# Patient Record
Sex: MALE | Race: WHITE | NOT HISPANIC OR LATINO | Employment: OTHER | URBAN - METROPOLITAN AREA
[De-identification: names, ages, dates, MRNs, and addresses within clinical notes are randomized per-mention and may not be internally consistent; named-entity substitution may affect disease eponyms.]

---

## 2017-02-06 ENCOUNTER — OFFICE VISIT (OUTPATIENT)
Dept: INTERNAL MEDICINE | Facility: CLINIC | Age: 52
End: 2017-02-06

## 2017-02-06 ENCOUNTER — APPOINTMENT (OUTPATIENT)
Dept: LAB | Facility: HOSPITAL | Age: 52
End: 2017-02-06

## 2017-02-06 VITALS
HEIGHT: 69 IN | OXYGEN SATURATION: 97 % | BODY MASS INDEX: 25.62 KG/M2 | RESPIRATION RATE: 16 BRPM | SYSTOLIC BLOOD PRESSURE: 150 MMHG | DIASTOLIC BLOOD PRESSURE: 90 MMHG | TEMPERATURE: 99.1 F | HEART RATE: 80 BPM | WEIGHT: 173 LBS

## 2017-02-06 DIAGNOSIS — Z78.9 ALCOHOL USE: ICD-10-CM

## 2017-02-06 DIAGNOSIS — E53.9 VITAMIN B DEFICIENCY: ICD-10-CM

## 2017-02-06 DIAGNOSIS — E55.9 VITAMIN D DEFICIENCY: ICD-10-CM

## 2017-02-06 DIAGNOSIS — R79.89 ELEVATED LFTS: Primary | ICD-10-CM

## 2017-02-06 DIAGNOSIS — E78.00 PURE HYPERCHOLESTEROLEMIA: ICD-10-CM

## 2017-02-06 LAB
25(OH)D3 SERPL-MCNC: 34.5 NG/ML
ALBUMIN SERPL-MCNC: 4.6 G/DL (ref 3.2–4.8)
ALBUMIN/GLOB SERPL: 1.6 G/DL (ref 1.5–2.5)
ALP SERPL-CCNC: 85 U/L (ref 25–100)
ALT SERPL W P-5'-P-CCNC: 171 U/L (ref 7–40)
ANION GAP SERPL CALCULATED.3IONS-SCNC: 9 MMOL/L (ref 3–11)
ARTICHOKE IGE QN: 195 MG/DL (ref 0–130)
AST SERPL-CCNC: 137 U/L (ref 0–33)
BILIRUB SERPL-MCNC: 1.1 MG/DL (ref 0.3–1.2)
BUN BLD-MCNC: 9 MG/DL (ref 9–23)
BUN/CREAT SERPL: 12.9 (ref 7–25)
CALCIUM SPEC-SCNC: 10.1 MG/DL (ref 8.7–10.4)
CHLORIDE SERPL-SCNC: 100 MMOL/L (ref 99–109)
CHOLEST SERPL-MCNC: 289 MG/DL (ref 0–200)
CO2 SERPL-SCNC: 30 MMOL/L (ref 20–31)
CREAT BLD-MCNC: 0.7 MG/DL (ref 0.6–1.3)
GFR SERPL CREATININE-BSD FRML MDRD: 120 ML/MIN/1.73
GLOBULIN UR ELPH-MCNC: 2.8 GM/DL
GLUCOSE BLD-MCNC: 102 MG/DL (ref 70–100)
HDLC SERPL-MCNC: 58 MG/DL (ref 40–60)
POTASSIUM BLD-SCNC: 4.5 MMOL/L (ref 3.5–5.5)
PROT SERPL-MCNC: 7.4 G/DL (ref 5.7–8.2)
SODIUM BLD-SCNC: 139 MMOL/L (ref 132–146)
TRIGL SERPL-MCNC: 262 MG/DL (ref 0–150)
VIT B12 BLD-MCNC: 1109 PG/ML (ref 211–911)

## 2017-02-06 PROCEDURE — 99213 OFFICE O/P EST LOW 20 MIN: CPT | Performed by: INTERNAL MEDICINE

## 2017-02-06 PROCEDURE — 82306 VITAMIN D 25 HYDROXY: CPT | Performed by: INTERNAL MEDICINE

## 2017-02-06 PROCEDURE — 80061 LIPID PANEL: CPT | Performed by: INTERNAL MEDICINE

## 2017-02-06 PROCEDURE — 36415 COLL VENOUS BLD VENIPUNCTURE: CPT | Performed by: INTERNAL MEDICINE

## 2017-02-06 PROCEDURE — 82607 VITAMIN B-12: CPT | Performed by: INTERNAL MEDICINE

## 2017-02-06 PROCEDURE — 80053 COMPREHEN METABOLIC PANEL: CPT | Performed by: INTERNAL MEDICINE
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
## 2017-02-06 NOTE — PATIENT INSTRUCTIONS
1.  Laboratory tests today in the laboratory building.    2.  Plan on Lipitor - for cholesterol management.    3.  Limit alcohol - no more than 2 beers in 24 hours - and 6 beers weekly.    4.  Consider stopping all alcohol intake - for long-term safety.    5.  Return in May - fasting checkup and preventive exam.

## 2017-02-08 ENCOUNTER — TELEPHONE (OUTPATIENT)
Dept: INTERNAL MEDICINE | Facility: CLINIC | Age: 52
End: 2017-02-08

## 2017-02-08 NOTE — TELEPHONE ENCOUNTER
Msg left re labs:  (was 227) better but still too high; LFTs higher; need to return for 7:30 am office conference asap per TGF. Reaffirmed need to stop all alcohol to protect liver. Vit D and B12 has normalized; continue replacement for ongoing basis.

## 2017-02-10 ENCOUNTER — TELEPHONE (OUTPATIENT)
Dept: INTERNAL MEDICINE | Facility: CLINIC | Age: 52
End: 2017-02-10

## 2017-02-10 NOTE — TELEPHONE ENCOUNTER
Left message wondering if pt received our message needs to come for 7:30 a.m. appt asap re: lab work that is too high - requested pt call back to let us know if he received our original call

## 2022-01-26 ENCOUNTER — OFFICE VISIT (OUTPATIENT)
Dept: INTERNAL MEDICINE | Facility: CLINIC | Age: 57
End: 2022-01-26

## 2022-01-26 ENCOUNTER — LAB (OUTPATIENT)
Dept: LAB | Facility: HOSPITAL | Age: 57
End: 2022-01-26

## 2022-01-26 VITALS
SYSTOLIC BLOOD PRESSURE: 130 MMHG | WEIGHT: 168 LBS | TEMPERATURE: 97 F | OXYGEN SATURATION: 98 % | DIASTOLIC BLOOD PRESSURE: 92 MMHG | HEIGHT: 70 IN | BODY MASS INDEX: 24.05 KG/M2 | HEART RATE: 118 BPM

## 2022-01-26 DIAGNOSIS — K21.9 GASTROESOPHAGEAL REFLUX DISEASE, UNSPECIFIED WHETHER ESOPHAGITIS PRESENT: ICD-10-CM

## 2022-01-26 DIAGNOSIS — K76.9 LIVER DISEASE: ICD-10-CM

## 2022-01-26 DIAGNOSIS — Z90.81 HISTORY OF SPLENECTOMY: ICD-10-CM

## 2022-01-26 DIAGNOSIS — E55.9 VITAMIN D DEFICIENCY: ICD-10-CM

## 2022-01-26 DIAGNOSIS — R13.10 DYSPHAGIA, UNSPECIFIED TYPE: ICD-10-CM

## 2022-01-26 DIAGNOSIS — K43.9 VENTRAL HERNIA WITHOUT OBSTRUCTION OR GANGRENE: ICD-10-CM

## 2022-01-26 DIAGNOSIS — Z78.9 ALCOHOL USE: ICD-10-CM

## 2022-01-26 DIAGNOSIS — R05.3 CHRONIC COUGH: ICD-10-CM

## 2022-01-26 DIAGNOSIS — Z23 NEED FOR 23-POLYVALENT PNEUMOCOCCAL POLYSACCHARIDE VACCINE: Primary | ICD-10-CM

## 2022-01-26 PROBLEM — K70.31 ALCOHOLIC CIRRHOSIS OF LIVER WITH ASCITES (HCC): Status: ACTIVE | Noted: 2022-01-26

## 2022-01-26 PROCEDURE — 90732 PPSV23 VACC 2 YRS+ SUBQ/IM: CPT | Performed by: STUDENT IN AN ORGANIZED HEALTH CARE EDUCATION/TRAINING PROGRAM

## 2022-01-26 PROCEDURE — 90471 IMMUNIZATION ADMIN: CPT | Performed by: STUDENT IN AN ORGANIZED HEALTH CARE EDUCATION/TRAINING PROGRAM

## 2022-01-26 PROCEDURE — 99215 OFFICE O/P EST HI 40 MIN: CPT | Performed by: STUDENT IN AN ORGANIZED HEALTH CARE EDUCATION/TRAINING PROGRAM

## 2022-01-26 RX ORDER — PANTOPRAZOLE SODIUM 40 MG/1
40 TABLET, DELAYED RELEASE ORAL DAILY
Qty: 30 TABLET | Refills: 3 | Status: SHIPPED | OUTPATIENT
Start: 2022-01-26 | End: 2022-03-30

## 2022-01-26 NOTE — PROGRESS NOTES
Immunization  Immunization performed in Left deltoil by Kailee Henderson MA. Patient tolerated the procedure well without complications.  01/26/22   Kailee Henderson MA
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
## 2022-01-28 ENCOUNTER — TELEPHONE (OUTPATIENT)
Dept: INTERNAL MEDICINE | Facility: CLINIC | Age: 57
End: 2022-01-28

## 2022-01-28 DIAGNOSIS — K76.9 LIVER DISEASE: Primary | ICD-10-CM

## 2022-01-28 LAB
25(OH)D3+25(OH)D2 SERPL-MCNC: 37 NG/ML (ref 30–100)
ALBUMIN SERPL-MCNC: NORMAL G/DL
ALP SERPL-CCNC: NORMAL U/L
ALT SERPL-CCNC: NORMAL U/L
AST SERPL-CCNC: NORMAL U/L
BILIRUB SERPL-MCNC: NORMAL MG/DL
BUN SERPL-MCNC: NORMAL MG/DL
CALCIUM SERPL-MCNC: NORMAL MG/DL
CHLORIDE SERPL-SCNC: NORMAL MMOL/L
CHOLEST SERPL-MCNC: 175 MG/DL (ref 0–200)
CO2 SERPL-SCNC: NORMAL MMOL/L
CREAT SERPL-MCNC: NORMAL MG/DL
ERYTHROCYTE [DISTWIDTH] IN BLOOD BY AUTOMATED COUNT: 11.4 % (ref 12.3–15.4)
GLUCOSE SERPL-MCNC: NORMAL MG/DL
HAV AB SER QL IA: NEGATIVE
HBA1C MFR BLD: 5.1 % (ref 4.8–5.6)
HBV CORE AB SERPL QL IA: NEGATIVE
HBV SURFACE AB SER QL: NON REACTIVE
HBV SURFACE AG SERPL QL IA: NEGATIVE
HCT VFR BLD AUTO: 46.2 % (ref 37.5–51)
HCV AB S/CO SERPL IA: 0.2 S/CO RATIO (ref 0–0.9)
HDLC SERPL-MCNC: 24 MG/DL (ref 40–60)
HGB BLD-MCNC: 15.8 G/DL (ref 13–17.7)
LDLC SERPL CALC-MCNC: 125 MG/DL (ref 0–100)
MCH RBC QN AUTO: 32.2 PG (ref 26.6–33)
MCHC RBC AUTO-ENTMCNC: 34.2 G/DL (ref 31.5–35.7)
MCV RBC AUTO: 94.3 FL (ref 79–97)
PLATELET # BLD AUTO: 460 10*3/MM3 (ref 140–450)
POTASSIUM SERPL-SCNC: NORMAL MMOL/L
PROT SERPL-MCNC: NORMAL G/DL
RBC # BLD AUTO: 4.9 10*6/MM3 (ref 4.14–5.8)
REQUEST PROBLEM: NORMAL
SODIUM SERPL-SCNC: NORMAL MMOL/L
TRIGL SERPL-MCNC: 143 MG/DL (ref 0–150)
VIT B12 SERPL-MCNC: 665 PG/ML (ref 211–946)
VLDLC SERPL CALC-MCNC: 26 MG/DL (ref 5–40)
WBC # BLD AUTO: 16.72 10*3/MM3 (ref 3.4–10.8)

## 2022-01-28 NOTE — TELEPHONE ENCOUNTER
----- Message from Nurys Stephens MD sent at 1/26/2022  3:37 PM EST -----  Please call Clinton (this is the name he goes by) and let him know that I sent in a prescription for reflux medication for him to take daily 30 minutes prior to his first meal.

## 2022-01-28 NOTE — TELEPHONE ENCOUNTER
----- Message from Nurys Stephens MD sent at 1/27/2022  6:21 PM EST -----  Regarding: Labs  Oscar Mendoza had labs collected yesterday that usually come back within 24 hours and no results are available. Will you ask the lab if there was an issue with the collection? Thanks!

## 2022-01-28 NOTE — TELEPHONE ENCOUNTER
Michelle LEBLANC/ [facility] called to report PT had a High potassium and that they had a bad read on the CMP lab. Stated they would need another order/sample. Please advise.

## 2022-01-31 ENCOUNTER — TELEPHONE (OUTPATIENT)
Dept: INTERNAL MEDICINE | Facility: CLINIC | Age: 57
End: 2022-01-31

## 2022-02-01 ENCOUNTER — LAB (OUTPATIENT)
Dept: LAB | Facility: HOSPITAL | Age: 57
End: 2022-02-01

## 2022-02-02 ENCOUNTER — TELEPHONE (OUTPATIENT)
Dept: INTERNAL MEDICINE | Facility: CLINIC | Age: 57
End: 2022-02-02

## 2022-02-02 NOTE — TELEPHONE ENCOUNTER
----- Message from Nurys Stephens MD sent at 2/2/2022 11:00 AM EST -----  Please call Clinton and let him know that his final lab work has returned. His kidney function and electrolytes look ok. Some of his liver numbers are somewhat elevated. We will know more about liver issues once he gets his ultrasound.

## 2022-02-02 NOTE — TELEPHONE ENCOUNTER
Patient advised of lab results and will know more about liver issues once he has gotten the ultrasound

## 2022-02-08 ENCOUNTER — HOSPITAL ENCOUNTER (OUTPATIENT)
Dept: ULTRASOUND IMAGING | Facility: HOSPITAL | Age: 57
Discharge: HOME | End: 2022-02-08

## 2022-02-08 ENCOUNTER — HOSPITAL ENCOUNTER (OUTPATIENT)
Dept: GENERAL RADIOLOGY | Facility: HOSPITAL | Age: 57
Discharge: HOME | End: 2022-02-08

## 2022-02-08 DIAGNOSIS — K76.9 LIVER DISEASE: ICD-10-CM

## 2022-02-08 DIAGNOSIS — R05.3 CHRONIC COUGH: ICD-10-CM

## 2022-02-08 PROCEDURE — 71046 X-RAY EXAM CHEST 2 VIEWS: CPT

## 2022-02-08 PROCEDURE — 76705 ECHO EXAM OF ABDOMEN: CPT

## 2022-02-09 ENCOUNTER — OFFICE VISIT (OUTPATIENT)
Dept: INTERNAL MEDICINE | Facility: CLINIC | Age: 57
End: 2022-02-09

## 2022-02-09 ENCOUNTER — APPOINTMENT (OUTPATIENT)
Dept: ULTRASOUND IMAGING | Facility: HOSPITAL | Age: 57
End: 2022-02-09

## 2022-02-09 VITALS
SYSTOLIC BLOOD PRESSURE: 130 MMHG | DIASTOLIC BLOOD PRESSURE: 80 MMHG | HEIGHT: 70 IN | OXYGEN SATURATION: 97 % | HEART RATE: 117 BPM | BODY MASS INDEX: 24.34 KG/M2 | WEIGHT: 170 LBS | TEMPERATURE: 97 F

## 2022-02-09 DIAGNOSIS — R05.9 COUGH: ICD-10-CM

## 2022-02-09 DIAGNOSIS — K70.31 ALCOHOLIC CIRRHOSIS OF LIVER WITH ASCITES: Primary | ICD-10-CM

## 2022-02-09 PROCEDURE — 99214 OFFICE O/P EST MOD 30 MIN: CPT | Performed by: STUDENT IN AN ORGANIZED HEALTH CARE EDUCATION/TRAINING PROGRAM

## 2022-02-09 RX ORDER — SPIRONOLACTONE 100 MG/1
100 TABLET, FILM COATED ORAL DAILY
Qty: 30 TABLET | Refills: 0 | Status: SHIPPED | OUTPATIENT
Start: 2022-02-09 | End: 2022-03-17

## 2022-02-09 RX ORDER — FUROSEMIDE 40 MG/1
40 TABLET ORAL DAILY
Qty: 30 TABLET | Refills: 0 | Status: SHIPPED | OUTPATIENT
Start: 2022-02-09 | End: 2022-03-30

## 2022-02-09 NOTE — PROGRESS NOTES
Internal Medicine Established Office Visit      Date: 2022     Patient Name: Oscar Mendoza  : 1965   MRN: [id number]     Chief Complaint:    Chief Complaint   Patient presents with   • Follow-up   • Cough       History of Present Illness: Oscar Mendoza is a 56 y.o. male who presents to clinic today for follow up. He reports that issues with reflux and dysphagia have almost completely resolved with starting pantoprazole 40 mg daily. He is also no longer having diarrhea. He feels that his muscles are stronger and that his appetite has improved. He had a liver ultrasound performed yesterday which demonstrated a nodular liver consistent with cirrhosis and a large amount of ascites.  He also continues to have a cough which is usually dry but occasionally he coughs up thin, foamy fluid.  He remains abstinent from alcohol.    Subjective     I have reviewed and the following portions of the patient's history were updated as appropriate: past family history, past medical history, past social history, past surgical history and problem list.     Medications:     Current Outpatient Medications:   •  Cholecalciferol (VITAMIN D3) 5000 UNITS capsule capsule, Take 5,000 Units by mouth Daily., Disp: , Rfl:   •  Multiple Vitamins-Minerals (CENTRUM ADULTS PO), Take 1 tablet by mouth Daily., Disp: , Rfl:   •  pantoprazole (Protonix) 40 MG EC tablet, Take 1 tablet by mouth Daily., Disp: 30 tablet, Rfl: 3  •  vitamin B-12 (CYANOCOBALAMIN) 2500 MCG sublingual tablet tablet, Place 1 tablet under the tongue Daily., Disp: , Rfl:   •  Diclofenac Sodium (VOLTAREN) 1 % gel gel, Apply 4 g topically to the appropriate area as directed 2 (Two) Times a Day As Needed (for muscle pain)., Disp: 50 g, Rfl: 0  •  furosemide (Lasix) 40 MG tablet, Take 1 tablet by mouth Daily., Disp: 30 tablet, Rfl: 0  •  spironolactone (ALDACTONE) 100 MG tablet, Take 1 tablet by mouth Daily., Disp: 30 tablet, Rfl: 0    Allergies:   No  "Known Allergies    Objective     Physical Exam:   Vital Signs:   Vitals:    02/09/22 1332   BP: 130/80   Pulse: 117   Temp: 97 °F (36.1 °C)   SpO2: 97%   Weight: 77.1 kg (170 lb)   Height: 177.8 cm (70\")   PainSc: 0-No pain     Body mass index is 24.39 kg/m².     Physical Exam  Vitals and nursing note reviewed.   Constitutional:       Appearance: Normal appearance.   Cardiovascular:      Rate and Rhythm: Normal rate and regular rhythm.      Heart sounds: Normal heart sounds.   Pulmonary:      Effort: Pulmonary effort is normal.      Breath sounds: Normal breath sounds. No wheezing, rhonchi or rales.   Abdominal:      General: There is distension.      Tenderness: There is no abdominal tenderness.      Hernia: A hernia is present.   Skin:     General: Skin is warm and dry.   Neurological:      Mental Status: He is alert.   Psychiatric:         Mood and Affect: Mood normal.         Behavior: Behavior normal.       Assessment / Plan      Assessment/Plan:   Diagnoses and all orders for this visit:    1. Alcoholic cirrhosis of liver with ascites (HCC) (Primary)  Decompensated by ascites. No signs of other complications. Abdomen is non-tender. Kidney function is normal so will initiate diuretic therapy to relieve ascites if possible. Lasix 40 mg daily and spironolactone 10 mg daily started. Plan to repeat BMP in 1 week. Order has been placed.   - Ascites: Large volume. Non-tender. New diagnosis so no prior paracentesis. Starting Lasix 40 mg daily and spironolactone 100 mg daily. Repeat BMP in ~ 1 week.   - EV: No melena/hematochezia. Last EGD in 2017 at [place] with no varices but also no signs of cirrhosis at that time. Referral to GI placed at last appointment for assessment.   - HE: No confusion or asterixis. Discussed cause and signs/symptoms. No need for lactulose.   - MELD-Na: 11. Referral to transplant surgery since MELD is >10.   - No thrombocytopenia or hyponatremia.   - Last EtOH use: 01/01/2022. Counseled " patient on continued abstinence.   - Discussed recommended vaccines. Patient to receive initial Hepatitis A and Flu vaccine at the pharmacy. Will need to complete Hepatitis A series and Hepatitis B series as he was non-immune.     2. Cough  May be in the setting of atelectasis and hepatic hydrothorax in the setting of large volume ascites. No infectious type symptoms.        Follow Up:   Return in about 2 weeks (around 2/23/2022) for Recheck, 30 minute visit .    Time:  I spent approximately 35 minutes providing clinical care for this patient; including review of patient's chart and provider documentation, face to face time spent with patient in examination room (obtaining history, performing physical exam, discussing diagnosis and management options), placing orders, and completing patient documentation.     Nurys Stephens MD  [facility] [place] [place]

## 2022-02-14 ENCOUNTER — TELEPHONE (OUTPATIENT)
Dept: INTERNAL MEDICINE | Facility: CLINIC | Age: 57
End: 2022-02-14

## 2022-02-14 NOTE — TELEPHONE ENCOUNTER
PATIENT HAS CALLED AND WANTED TO LET PCP KNOW THAT HIS ANKLES AND FEET ARE SWOLLEN AND IS REQUESTING A CALL BACK TO LET HIM KNOW IF HE SHOULD BE CONCERNED.    CALL BACK NUMBER [phone]

## 2022-02-14 NOTE — TELEPHONE ENCOUNTER
Pt states he is taking meds and is weighing himself. States seems to be in abdomen mostly, but is in feet/ankles as well. Pt states has gone down some and swelling gets better in feet/ankles after he elevates them.

## 2022-02-14 NOTE — TELEPHONE ENCOUNTER
Pt couldn't make in to office today or tomorrow for any of the open times, scheduled for Wed 2/16/22

## 2022-02-16 ENCOUNTER — OFFICE VISIT (OUTPATIENT)
Dept: INTERNAL MEDICINE | Facility: CLINIC | Age: 57
End: 2022-02-16

## 2022-02-16 VITALS
OXYGEN SATURATION: 96 % | WEIGHT: 164 LBS | DIASTOLIC BLOOD PRESSURE: 86 MMHG | SYSTOLIC BLOOD PRESSURE: 118 MMHG | HEART RATE: 119 BPM | BODY MASS INDEX: 23.48 KG/M2 | HEIGHT: 70 IN

## 2022-02-16 DIAGNOSIS — Z23 NEED FOR VACCINATION: ICD-10-CM

## 2022-02-16 DIAGNOSIS — K70.31 ALCOHOLIC CIRRHOSIS OF LIVER WITH ASCITES: Primary | ICD-10-CM

## 2022-02-16 PROCEDURE — 90632 HEPA VACCINE ADULT IM: CPT | Performed by: STUDENT IN AN ORGANIZED HEALTH CARE EDUCATION/TRAINING PROGRAM

## 2022-02-16 PROCEDURE — 90746 HEPB VACCINE 3 DOSE ADULT IM: CPT | Performed by: STUDENT IN AN ORGANIZED HEALTH CARE EDUCATION/TRAINING PROGRAM

## 2022-02-16 PROCEDURE — 90472 IMMUNIZATION ADMIN EACH ADD: CPT | Performed by: STUDENT IN AN ORGANIZED HEALTH CARE EDUCATION/TRAINING PROGRAM

## 2022-02-16 PROCEDURE — 90471 IMMUNIZATION ADMIN: CPT | Performed by: STUDENT IN AN ORGANIZED HEALTH CARE EDUCATION/TRAINING PROGRAM

## 2022-02-16 PROCEDURE — 99214 OFFICE O/P EST MOD 30 MIN: CPT | Performed by: STUDENT IN AN ORGANIZED HEALTH CARE EDUCATION/TRAINING PROGRAM

## 2022-02-16 NOTE — PROGRESS NOTES
"              Internal Medicine Acute Visit     Patient Name: Oscar Mendoza  : 1965   MRN: [id number]     Chief Complaint:    Chief Complaint   Patient presents with   • Edema     feet and ankles, was pretty bad on Monday-better today       History of Present Illness: Oscar Mendoza is a 56 y.o. male who presents for LE edema. He reports that when he made the appointment earlier this week, the edema was much worse. It has improved over the last few days. He has also noted a reduction in fluid around his abdomen. He has had a 6 lb weight loss since his last appointment. He reports having more energy and overall feeling better. He continues to avoid alcohol use.     Subjective     I have reviewed and the following portions of the patient's history were updated as appropriate: past family history, past medical history, past social history, past surgical history and problem list.    Allergies:   No Known Allergies    Objective     Physical Exam:  Vital Signs:   Vitals:    22 1348   BP: 118/86   Pulse: 119   SpO2: 96%  Comment: ra   Weight: 74.4 kg (164 lb)   Height: 177.8 cm (70\")   PainSc: 0-No pain     Body mass index is 23.53 kg/m².    Physical Exam  Vitals and nursing note reviewed.   Constitutional:       General: He is not in acute distress.     Appearance: Normal appearance.   HENT:      Head: Normocephalic and atraumatic.   Abdominal:      General: Abdomen is protuberant.      Palpations: There is fluid wave.      Tenderness: There is no abdominal tenderness.      Hernia: A hernia is present. Hernia is present in the umbilical area and ventral area.   Neurological:      Mental Status: He is alert.       Assessment / Plan      Assessment/Plan:   Diagnoses and all orders for this visit:    1. Alcoholic cirrhosis of liver with ascites (HCC) (Primary)  LE edema and ascites improving with Lasix 40 mg and Spironolactone 100 mg daily. Continue these medications for now. Repeat CMP and mag " ordered while on new diuretic therapy to follow renal function. First Hepatitis A and Hepatitis B vaccines given today.     2. Need for vaccination  -     Hepatitis A Vaccine Adult IM  -     Hepatitis B Vaccine Adult IM - Engerix    Follow Up:   Return for Next scheduled follow up.    Time:   I spent approximately 15 minutes providing clinical care for this patient; including review of patient's chart and provider documentation, face to face time spent with patient in examination room (obtaining history, performing physical exam, discussing diagnosis and management options), placing orders, and completing patient documentation.     Prescription drug management completed and 3 unique labs ordered consistent with moderate MDM.     Nurys Stephens MD  [facility] [place] [facility] [place]

## 2022-02-16 NOTE — PROGRESS NOTES
Immunization  Immunization performed in LD AND RD by Bernarda White MA. Patient tolerated the procedure well without complications.  02/16/22   Bernarda White MA

## 2022-02-24 ENCOUNTER — LAB (OUTPATIENT)
Dept: LAB | Facility: HOSPITAL | Age: 57
End: 2022-02-24

## 2022-02-24 ENCOUNTER — OFFICE VISIT (OUTPATIENT)
Dept: INTERNAL MEDICINE | Facility: CLINIC | Age: 57
End: 2022-02-24

## 2022-02-24 VITALS
HEART RATE: 115 BPM | OXYGEN SATURATION: 95 % | BODY MASS INDEX: 22.19 KG/M2 | SYSTOLIC BLOOD PRESSURE: 122 MMHG | RESPIRATION RATE: 16 BRPM | HEIGHT: 70 IN | DIASTOLIC BLOOD PRESSURE: 84 MMHG | WEIGHT: 155 LBS

## 2022-02-24 DIAGNOSIS — Z78.9 ALCOHOL USE: ICD-10-CM

## 2022-02-24 DIAGNOSIS — R10.13 DYSPEPSIA: ICD-10-CM

## 2022-02-24 DIAGNOSIS — K70.31 ALCOHOLIC CIRRHOSIS OF LIVER WITH ASCITES: Primary | ICD-10-CM

## 2022-02-24 PROCEDURE — 99214 OFFICE O/P EST MOD 30 MIN: CPT | Performed by: STUDENT IN AN ORGANIZED HEALTH CARE EDUCATION/TRAINING PROGRAM

## 2022-02-24 NOTE — PROGRESS NOTES
"     Internal Medicine Established Office Visit      Date: 2022     Patient Name: Oscar Mendoza  : 1965   MRN: [id number]     Chief Complaint:    Chief Complaint   Patient presents with   • Follow-up     2 week follow up        History of Present Illness: Oscar Mendoza is a 56 y.o. male who presents to clinic today for follow up. Weight is down 9 lbs since 2022 due to continued diuresis. Abdomen is much smaller. He denies LE edema. Has not had EtOH. Stopped taking pantoprazole and has not had any further epigastric pain.     Subjective     I have reviewed and the following portions of the patient's history were updated as appropriate: past family history, past medical history, past social history, past surgical history and problem list.     Medications:     Current Outpatient Medications:   •  Cholecalciferol (VITAMIN D3) 5000 UNITS capsule capsule, Take 5,000 Units by mouth Daily., Disp: , Rfl:   •  Diclofenac Sodium (VOLTAREN) 1 % gel gel, Apply 4 g topically to the appropriate area as directed 2 (Two) Times a Day As Needed (for muscle pain)., Disp: 50 g, Rfl: 0  •  furosemide (Lasix) 40 MG tablet, Take 1 tablet by mouth Daily., Disp: 30 tablet, Rfl: 0  •  Multiple Vitamins-Minerals (CENTRUM ADULTS PO), Take 1 tablet by mouth Daily., Disp: , Rfl:   •  spironolactone (ALDACTONE) 100 MG tablet, Take 1 tablet by mouth Daily., Disp: 30 tablet, Rfl: 0  •  vitamin B-12 (CYANOCOBALAMIN) 2500 MCG sublingual tablet tablet, Place 1 tablet under the tongue Daily., Disp: , Rfl:   •  pantoprazole (Protonix) 40 MG EC tablet, Take 1 tablet by mouth Daily., Disp: 30 tablet, Rfl: 3    Allergies:   No Known Allergies    Objective     Physical Exam:   Vital Signs:   Vitals:    22 1343   BP: 122/84   BP Location: Left arm   Patient Position: Sitting   Cuff Size: Adult   Pulse: 115   Resp: 16   SpO2: 95%   Weight: 70.3 kg (155 lb)   Height: 177.8 cm (70\")   PainSc: 0-No pain     Body mass index " is 22.24 kg/m².     Physical Exam  Vitals and nursing note reviewed.   Constitutional:       Appearance: Normal appearance.   Cardiovascular:      Rate and Rhythm: Normal rate.   Pulmonary:      Effort: Pulmonary effort is normal. No respiratory distress.   Abdominal:      General: There is distension (very much improved though).      Palpations: Abdomen is soft.      Hernia: A hernia is present.   Musculoskeletal:      Right lower leg: No edema.      Left lower leg: No edema.   Skin:     General: Skin is warm and dry.   Neurological:      Mental Status: He is alert.   Psychiatric:         Mood and Affect: Mood normal.         Behavior: Behavior normal.       Assessment / Plan      Assessment/Plan:   Diagnoses and all orders for this visit:    1. Alcoholic cirrhosis of liver with ascites (HCC) (Primary)  Decompensated by ascites. No signs of other complications. Abdomen is non-tender and not as distended as previously. Continue Lasix 40 mg daily and spironolactone 10 mg daily. BMP and mag ordered at last appointment still need to be drawn.   - Ascites: Improved with diuresis. No prior paracentesis. Non-tender. Conitnue Lasix 40 mg daily and spironolactone 100 mg daily.  - EV: No melena/hematochezia. Last EGD in 2017 at [facility] with no varices but also no signs of cirrhosis at that time. Referral to GI placed at last appointment for assessment. Missed first appointment but will reschedule.   - HE: No confusion or asterixis. Discussed cause and signs/symptoms. No need for lactulose.   - MELD-Na: 11. Referral to transplant surgery since MELD is >10. They have called him and he has returned call but they keep missing each other. Will retry calling them. Discussed what the appointment was for.   - No thrombocytopenia or hyponatremia.   - Last EtOH use: 01/01/2022. Counseled patient on continued abstinence.   - Discussed recommended vaccines. Second Hepatitis A due 08/2022. Second Hepatitis B due ~03/16/2022.   - Referral  to dietician to discuss best diet to regain muscle mass.   - Needs RUQ U/S for HCC surveillance. Will order if he does not establish with GI soon.     2. Alcohol use  Continued abstinence with last drink 01/01/2022.     3. Dyspepsia  Resolved now that he has not had EtOH. Likely gastritis from EtOH intake. Stable off of pantoprazole. No further issues with dysphagia.      Follow Up:   Return in about 2 months (around 4/24/2022) for Recheck, 30 minute .    Time:  I spent approximately 25 minutes providing clinical care for this patient; including review of patient's chart and provider documentation, face to face time spent with patient in examination room (obtaining history, performing physical exam, discussing diagnosis and management options), placing orders, and completing patient documentation.     Nurys Stephens MD  [facility]

## 2022-02-28 LAB
INR PPP: 1.2 (ref 0.85–1.16)
PROTHROMBIN TIME: 14.1 SECONDS (ref 11.5–14)

## 2022-03-17 DIAGNOSIS — K70.31 ALCOHOLIC CIRRHOSIS OF LIVER WITH ASCITES: ICD-10-CM

## 2022-03-17 RX ORDER — SPIRONOLACTONE 100 MG/1
100 TABLET, FILM COATED ORAL DAILY
Qty: 30 TABLET | Refills: 0 | Status: SHIPPED | OUTPATIENT
Start: 2022-03-17 | End: 2022-03-30

## 2022-03-30 ENCOUNTER — OFFICE VISIT (OUTPATIENT)
Dept: GASTROENTEROLOGY | Facility: CLINIC | Age: 57
End: 2022-03-30

## 2022-03-30 VITALS
HEART RATE: 98 BPM | TEMPERATURE: 97.5 F | DIASTOLIC BLOOD PRESSURE: 74 MMHG | OXYGEN SATURATION: 98 % | BODY MASS INDEX: 22.19 KG/M2 | SYSTOLIC BLOOD PRESSURE: 102 MMHG | WEIGHT: 155 LBS | HEIGHT: 70 IN

## 2022-03-30 DIAGNOSIS — Z12.11 SCREEN FOR COLON CANCER: ICD-10-CM

## 2022-03-30 DIAGNOSIS — K70.31 ALCOHOLIC CIRRHOSIS OF LIVER WITH ASCITES: Primary | ICD-10-CM

## 2022-03-30 PROCEDURE — 99204 OFFICE O/P NEW MOD 45 MIN: CPT | Performed by: NURSE PRACTITIONER
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
## 2022-05-09 ENCOUNTER — TELEPHONE (OUTPATIENT)
Dept: GASTROENTEROLOGY | Facility: CLINIC | Age: 57
End: 2022-05-09

## 2022-05-11 ENCOUNTER — OFFICE VISIT (OUTPATIENT)
Dept: INTERNAL MEDICINE | Facility: CLINIC | Age: 57
End: 2022-05-11

## 2022-05-11 VITALS
TEMPERATURE: 97 F | BODY MASS INDEX: 22.19 KG/M2 | OXYGEN SATURATION: 98 % | DIASTOLIC BLOOD PRESSURE: 66 MMHG | WEIGHT: 155 LBS | SYSTOLIC BLOOD PRESSURE: 112 MMHG | HEART RATE: 64 BPM | HEIGHT: 70 IN

## 2022-05-11 DIAGNOSIS — M54.50 CHRONIC BILATERAL LOW BACK PAIN WITHOUT SCIATICA: ICD-10-CM

## 2022-05-11 DIAGNOSIS — I83.811 VARICOSE VEINS OF RIGHT LOWER EXTREMITY WITH PAIN: ICD-10-CM

## 2022-05-11 DIAGNOSIS — K70.31 ALCOHOLIC CIRRHOSIS OF LIVER WITH ASCITES: Primary | ICD-10-CM

## 2022-05-11 DIAGNOSIS — G89.29 CHRONIC BILATERAL LOW BACK PAIN WITHOUT SCIATICA: ICD-10-CM

## 2022-05-11 DIAGNOSIS — D22.9 BENIGN SKIN MOLE: ICD-10-CM

## 2022-05-11 PROCEDURE — 99214 OFFICE O/P EST MOD 30 MIN: CPT | Performed by: STUDENT IN AN ORGANIZED HEALTH CARE EDUCATION/TRAINING PROGRAM

## 2022-05-11 NOTE — PROGRESS NOTES
Internal Medicine Established Office Visit      Date: 2022     Patient Name: Oscar Mendoza  : 1965   MRN: [id number]     Chief Complaint:    Chief Complaint   Patient presents with   • Follow-up     Losing weight   • Back Pain       History of Present Illness: Oscar Mendoza is a 56 y.o. male who presents to clinic today for follow up.     EtOH Cirrhosis, compensated   No longer on diuretics with no return of ascites or LE edema. No HE or melena/hematemsis. Established with GI with plan for upcoming EGD for variceal surveillance. Started series for hepatitis B at last appointment. Needs vaccination for hepatitis A as well. Remains EtOH free.     - He reports new areas on his lower extremity that are raised with irregular borders and dark purple. Sometimes they seem to be more raised than usual. Resemble spider veins.     - He has a mole on his hair line that was cut during his last haircut so it now feels abnormal. He is unable to see it so cannot speak to it's color or borders. He does note that it has not changed in shape with the exception of being cut.     - He is having more back pain. Describes it as occasional tension in his lower back muscles that is sharp and takes his breath away. At baseline, he has achy pain in all his joints. Unable to take NSAIDs due to cirrhosis. Tylenol does not help.       Subjective     I have reviewed and the following portions of the patient's history were updated as appropriate: past family history, past medical history, past social history, past surgical history and problem list.     Medications:     Current Outpatient Medications:   •  Cholecalciferol (VITAMIN D3) 5000 UNITS capsule capsule, Take 5,000 Units by mouth Daily., Disp: , Rfl:   •  Multiple Vitamins-Minerals (CENTRUM ADULTS PO), Take 1 tablet by mouth Daily., Disp: , Rfl:   •  vitamin B-12 (CYANOCOBALAMIN) 2500 MCG sublingual tablet tablet, Place 1 tablet under the tongue Daily.,  "Disp: , Rfl:     Allergies:   No Known Allergies    Objective     Physical Exam:   Vital Signs:   Vitals:    05/11/22 1400   BP: 112/66   Pulse: 64   Temp: 97 °F (36.1 °C)   SpO2: 98%   Weight: 70.3 kg (155 lb)   Height: 177.8 cm (70\")   PainSc:   6     Body mass index is 22.24 kg/m².     Physical Exam  Vitals and nursing note reviewed.   Constitutional:       General: He is not in acute distress.     Appearance: He is not ill-appearing or toxic-appearing.   HENT:      Head: Normocephalic and atraumatic.   Eyes:      General: No scleral icterus.  Cardiovascular:      Rate and Rhythm: Normal rate and regular rhythm.   Pulmonary:      Effort: Pulmonary effort is normal. No respiratory distress.   Abdominal:      General: There is no distension.      Tenderness: There is no abdominal tenderness.   Musculoskeletal:      Right lower leg: No edema.      Left lower leg: No edema.   Skin:     Comments: 3-4 cm area of enlarged, superficial veins on RLE. Mole noted on the lower right hair line. Appears to be healing from being cut. Otherwise, no abnormal borders, asymmetry, or abnormal color.   Neurological:      Mental Status: He is alert.       Assessment / Plan      Assessment/Plan:   Diagnoses and all orders for this visit:    1. Alcoholic cirrhosis of liver with ascites (HCC) (Primary)  Now compensated. No longer requiring diuretics to treat ascites. No signs of other complications. Abdomen is non-tender and not as distended.   - Ascites: Resolved with diuresis with Lasic and spironolactone.  No longer requiring these medications and maintaining without them. No prior paracentesis. Non-tender.  - EV: No melena/hematochezia. Last EGD in 2017 at [place] with no varices but also no signs of cirrhosis at that time. Now following with GI with plan for upcoming EGD for variceal surveillance.   - HE: No confusion or asterixis. Discussed cause and signs/symptoms. No need for lactulose.   - MELD-Na: Based on labs from 02/01/2022: " 11. Referral to transplant surgery since MELD is >10 place in 02/2022. MELD may have improved now that he is no longer drinking so may not require this referral. Can recheck MELD labs at next appointment.   - No thrombocytopenia or hyponatremia.   - Last EtOH use: 01/01/2022. Counseled patient on continued abstinence.   - Discussed recommended vaccines. Second Hepatitis A due 08/2022. Second Hepatitis B due ~03/16/2022.   - RUQ U/S for HCC surveillance q6months. Next due 07/2022.     2. Chronic bilateral low back pain without sciatica  Avoid NSAIDs. Consider PT.     3. Varicose veins of right lower extremity with pain   Referral to vascular.     4. Benign skin mole  Monitor.     Follow Up:   Return in about 3 months (around 8/11/2022) for he is considering who he would like to switch to .    Time:  I spent approximately 25 minutes providing clinical care for this patient; including review of patient's chart and provider documentation, face to face time spent with patient in examination room (obtaining history, performing physical exam, discussing diagnosis and management options), placing orders, and completing patient documentation.     Nurys Stephens MD  [facility]

## 2022-07-05 RX ORDER — SODIUM, POTASSIUM,MAG SULFATES 17.5-3.13G
SOLUTION, RECONSTITUTED, ORAL ORAL
Qty: 354 ML | Refills: 0 | Status: SHIPPED | OUTPATIENT
Start: 2022-07-05

## 2023-12-08 ENCOUNTER — OFFICE VISIT (OUTPATIENT)
Dept: FAMILY MEDICINE CLINIC | Facility: CLINIC | Age: 58
End: 2023-12-08

## 2023-12-08 VITALS
WEIGHT: 185 LBS | OXYGEN SATURATION: 96 % | SYSTOLIC BLOOD PRESSURE: 124 MMHG | BODY MASS INDEX: 26.48 KG/M2 | HEART RATE: 80 BPM | HEIGHT: 70 IN | DIASTOLIC BLOOD PRESSURE: 76 MMHG

## 2023-12-08 DIAGNOSIS — E55.9 VITAMIN D DEFICIENCY: ICD-10-CM

## 2023-12-08 DIAGNOSIS — Z12.11 SCREENING FOR COLON CANCER: ICD-10-CM

## 2023-12-08 DIAGNOSIS — Z90.81 HISTORY OF SPLENECTOMY: ICD-10-CM

## 2023-12-08 DIAGNOSIS — Z00.00 ENCOUNTER FOR MEDICAL EXAMINATION TO ESTABLISH CARE: ICD-10-CM

## 2023-12-08 DIAGNOSIS — Z23 IMMUNIZATION DUE: ICD-10-CM

## 2023-12-08 DIAGNOSIS — E78.00 PURE HYPERCHOLESTEROLEMIA: ICD-10-CM

## 2023-12-08 DIAGNOSIS — Z12.5 SCREENING PSA (PROSTATE SPECIFIC ANTIGEN): ICD-10-CM

## 2023-12-08 DIAGNOSIS — E53.9 VITAMIN B DEFICIENCY: ICD-10-CM

## 2023-12-08 DIAGNOSIS — K21.9 GASTROESOPHAGEAL REFLUX DISEASE, UNSPECIFIED WHETHER ESOPHAGITIS PRESENT: ICD-10-CM

## 2023-12-08 DIAGNOSIS — K70.30 ALCOHOLIC CIRRHOSIS OF LIVER WITHOUT ASCITES: Primary | ICD-10-CM

## 2023-12-08 RX ORDER — CHOLECALCIFEROL (VITAMIN D3) 125 MCG
TABLET ORAL
COMMUNITY
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
## 2023-12-08 NOTE — LETTER
[facility]  Vaccine Consent Form    Patient Name:  Oscar Mendoza  Patient :  1965     Vaccine(s) Ordered    Fluzone >6 Months (9061-9520)  Bexsero        Screening Checklist  The following questions should be completed prior to vaccination. If you answer “yes” to any question, it does not necessarily mean you should not be vaccinated. It just means we may need to clarify or ask more questions. If a question is unclear, please ask your healthcare provider to explain it.    Yes No   Any fever or moderate to severe illness today (mild illness and/or antibiotic treatment are not contraindications)?     Do you have a history of a serious reaction to any previous vaccinations, such as anaphylaxis, encephalopathy within 7 days, Guillain-Tacoma syndrome within 6 weeks, seizure?     Have you received any live vaccine(s) (e.g MMR, VANI) or any other vaccines in the last month (to ensure duplicate doses aren't given)?     Do you have an anaphylactic allergy to latex (DTaP, DTaP-IPV, Hep A, Hep B, MenB, RV, Td, Tdap), baker’s yeast (Hep B, HPV), polysorbates (RSV, nirsevimab, PCV 20, Rotavirrus, Tdap, Shingrix), or gelatin (VANI, MMR)?     Do you have an anaphylactic allergy to neomycin (Rabies, VANI, MMR, IPV, Hep A), polymyxin B (IPV), or streptomycin (IPV)?      Any cancer, leukemia, AIDS, or other immune system disorder? (VANI, MMR, RV)     Do you have a parent, brother, or sister with an immune system problem (if immune competence of vaccine recipient clinically verified, can proceed)? (MMR, VANI)     Any recent steroid treatments for >2 weeks, chemotherapy, or radiation treatment? (VANI, MMR)     Have you received antibody-containing blood transfusions or IVIG in the past 11 months (recommended interval is dependent on product)? (MMR, VANI)     Have you taken antiviral drugs (acyclovir, famciclovir, valacyclovir for VANI) in the last 24 or 48 hours, respectively?      Are you pregnant or planning to become pregnant  within 1 month? (VANI, MMR, HPV, IPV, MenB, Abrexvy; For Hep B- refer to Engerix-B; For RSV - Abrysvo is indicated for 32-36 weeks of pregnancy from September to January)     For infants, have you ever been told your child has had intussusception or a medical emergency involving obstruction of the intestine (Rotavirus)? If not for an infant, can skip this question.         *Ordering Physician/APC should be consulted if “yes” is checked by the patient or guardian above.      I have received, read, and understand the Vaccine Information Statement (VIS) for each vaccine ordered above.  I have considered my health status as well as the health status of my close contacts.  I have taken the opportunity to discuss my vaccine questions with my health care provider.   I have requested that the ordered vaccine(s) be given to me.  I understand the benefits and risks of the vaccines.  I understand that I should remain in the clinic for 15 minutes after receiving the vaccine(s).  _________________________________________________________  Signature of Patient or Parent/Legal Guardian ____________________  Date

## 2023-12-14 ENCOUNTER — PATIENT ROUNDING (BHMG ONLY) (OUTPATIENT)
Dept: FAMILY MEDICINE CLINIC | Facility: CLINIC | Age: 58
End: 2023-12-14
Payer: MEDICAID

## 2024-09-18 ENCOUNTER — LAB (OUTPATIENT)
Dept: LAB | Facility: HOSPITAL | Age: 59
End: 2024-09-18
Payer: MEDICAID

## 2024-09-18 DIAGNOSIS — E53.9 VITAMIN B DEFICIENCY: ICD-10-CM

## 2024-09-18 DIAGNOSIS — K21.9 GASTROESOPHAGEAL REFLUX DISEASE, UNSPECIFIED WHETHER ESOPHAGITIS PRESENT: ICD-10-CM

## 2024-09-18 DIAGNOSIS — E55.9 VITAMIN D DEFICIENCY: ICD-10-CM

## 2024-09-18 DIAGNOSIS — Z12.5 SCREENING PSA (PROSTATE SPECIFIC ANTIGEN): ICD-10-CM

## 2024-09-18 DIAGNOSIS — K70.30 ALCOHOLIC CIRRHOSIS OF LIVER WITHOUT ASCITES: ICD-10-CM

## 2024-09-18 DIAGNOSIS — E78.00 PURE HYPERCHOLESTEROLEMIA: ICD-10-CM

## 2024-09-18 DIAGNOSIS — Z00.00 ENCOUNTER FOR MEDICAL EXAMINATION TO ESTABLISH CARE: ICD-10-CM

## 2024-09-18 LAB
25(OH)D3 SERPL-MCNC: 31.5 NG/ML (ref 30–100)
ALBUMIN SERPL-MCNC: 4.3 G/DL (ref 3.5–5.2)
ALBUMIN/GLOB SERPL: 1.7 G/DL
ALP SERPL-CCNC: 64 U/L (ref 39–117)
ALT SERPL W P-5'-P-CCNC: 23 U/L (ref 1–41)
ANION GAP SERPL CALCULATED.3IONS-SCNC: 11 MMOL/L (ref 5–15)
AST SERPL-CCNC: 24 U/L (ref 1–40)
BILIRUB SERPL-MCNC: 0.4 MG/DL (ref 0–1.2)
BUN SERPL-MCNC: 7 MG/DL (ref 6–20)
BUN/CREAT SERPL: 7.1 (ref 7–25)
CALCIUM SPEC-SCNC: 9.6 MG/DL (ref 8.6–10.5)
CHLORIDE SERPL-SCNC: 105 MMOL/L (ref 98–107)
CHOLEST SERPL-MCNC: 216 MG/DL (ref 0–200)
CO2 SERPL-SCNC: 24 MMOL/L (ref 22–29)
CREAT SERPL-MCNC: 0.98 MG/DL (ref 0.76–1.27)
DEPRECATED RDW RBC AUTO: 40.2 FL (ref 37–54)
EGFRCR SERPLBLD CKD-EPI 2021: 90.5 ML/MIN/1.73
ERYTHROCYTE [DISTWIDTH] IN BLOOD BY AUTOMATED COUNT: 12.4 % (ref 12.3–15.4)
GLOBULIN UR ELPH-MCNC: 2.6 GM/DL
GLUCOSE SERPL-MCNC: 120 MG/DL (ref 65–99)
HBA1C MFR BLD: 6 % (ref 4.8–5.6)
HCT VFR BLD AUTO: 53.7 % (ref 37.5–51)
HDLC SERPL-MCNC: 35 MG/DL (ref 40–60)
HGB BLD-MCNC: 17.5 G/DL (ref 13–17.7)
INR PPP: 0.97 (ref 0.89–1.12)
LDLC SERPL CALC-MCNC: 145 MG/DL (ref 0–100)
LDLC/HDLC SERPL: 4.05 {RATIO}
MCH RBC QN AUTO: 28.8 PG (ref 26.6–33)
MCHC RBC AUTO-ENTMCNC: 32.6 G/DL (ref 31.5–35.7)
MCV RBC AUTO: 88.5 FL (ref 79–97)
PLATELET # BLD AUTO: 262 10*3/MM3 (ref 140–450)
PMV BLD AUTO: 12.6 FL (ref 6–12)
POTASSIUM SERPL-SCNC: 4.6 MMOL/L (ref 3.5–5.2)
PROT SERPL-MCNC: 6.9 G/DL (ref 6–8.5)
PROTHROMBIN TIME: 13 SECONDS (ref 12.2–14.5)
PSA SERPL-MCNC: 10.6 NG/ML (ref 0–4)
RBC # BLD AUTO: 6.07 10*6/MM3 (ref 4.14–5.8)
SODIUM SERPL-SCNC: 140 MMOL/L (ref 136–145)
TRIGL SERPL-MCNC: 197 MG/DL (ref 0–150)
TSH SERPL DL<=0.05 MIU/L-ACNC: 1.67 UIU/ML (ref 0.27–4.2)
VIT B12 BLD-MCNC: 300 PG/ML (ref 211–946)
VLDLC SERPL-MCNC: 36 MG/DL (ref 5–40)
WBC NRBC COR # BLD AUTO: 6 10*3/MM3 (ref 3.4–10.8)

## 2024-09-18 PROCEDURE — 82306 VITAMIN D 25 HYDROXY: CPT

## 2024-09-18 PROCEDURE — 82607 VITAMIN B-12: CPT

## 2024-09-18 PROCEDURE — 83036 HEMOGLOBIN GLYCOSYLATED A1C: CPT

## 2024-09-18 PROCEDURE — 80061 LIPID PANEL: CPT

## 2024-09-18 PROCEDURE — 85027 COMPLETE CBC AUTOMATED: CPT

## 2024-09-18 PROCEDURE — G0103 PSA SCREENING: HCPCS

## 2024-09-18 PROCEDURE — 84443 ASSAY THYROID STIM HORMONE: CPT

## 2024-09-18 PROCEDURE — 85610 PROTHROMBIN TIME: CPT

## 2024-09-18 PROCEDURE — 80053 COMPREHEN METABOLIC PANEL: CPT

## 2024-09-19 DIAGNOSIS — R97.20 ELEVATED PSA: Primary | ICD-10-CM

## 2024-12-09 ENCOUNTER — OFFICE VISIT (OUTPATIENT)
Dept: FAMILY MEDICINE CLINIC | Facility: CLINIC | Age: 59
End: 2024-12-09

## 2024-12-09 VITALS
HEIGHT: 70 IN | WEIGHT: 177.4 LBS | DIASTOLIC BLOOD PRESSURE: 80 MMHG | OXYGEN SATURATION: 99 % | HEART RATE: 87 BPM | BODY MASS INDEX: 25.4 KG/M2 | SYSTOLIC BLOOD PRESSURE: 132 MMHG

## 2024-12-09 DIAGNOSIS — E55.9 VITAMIN D DEFICIENCY: ICD-10-CM

## 2024-12-09 DIAGNOSIS — Z00.00 ANNUAL PHYSICAL EXAM: Primary | ICD-10-CM

## 2024-12-09 DIAGNOSIS — Z12.11 SCREENING FOR COLON CANCER: ICD-10-CM

## 2024-12-09 DIAGNOSIS — R97.20 ELEVATED PSA: ICD-10-CM

## 2024-12-09 DIAGNOSIS — K70.30 ALCOHOLIC CIRRHOSIS OF LIVER WITHOUT ASCITES: ICD-10-CM

## 2024-12-09 DIAGNOSIS — Z13.1 SCREENING FOR DIABETES MELLITUS: ICD-10-CM

## 2024-12-09 DIAGNOSIS — E53.9 VITAMIN B DEFICIENCY: ICD-10-CM

## 2024-12-09 DIAGNOSIS — Z23 IMMUNIZATION DUE: ICD-10-CM

## 2024-12-09 DIAGNOSIS — E78.00 PURE HYPERCHOLESTEROLEMIA: ICD-10-CM

## 2024-12-09 DIAGNOSIS — K21.9 GASTROESOPHAGEAL REFLUX DISEASE, UNSPECIFIED WHETHER ESOPHAGITIS PRESENT: ICD-10-CM

## 2024-12-09 PROCEDURE — 99396 PREV VISIT EST AGE 40-64: CPT | Performed by: STUDENT IN AN ORGANIZED HEALTH CARE EDUCATION/TRAINING PROGRAM

## 2024-12-09 PROCEDURE — 90472 IMMUNIZATION ADMIN EACH ADD: CPT | Performed by: STUDENT IN AN ORGANIZED HEALTH CARE EDUCATION/TRAINING PROGRAM

## 2024-12-09 PROCEDURE — 90746 HEPB VACCINE 3 DOSE ADULT IM: CPT | Performed by: STUDENT IN AN ORGANIZED HEALTH CARE EDUCATION/TRAINING PROGRAM

## 2024-12-09 PROCEDURE — 90471 IMMUNIZATION ADMIN: CPT | Performed by: STUDENT IN AN ORGANIZED HEALTH CARE EDUCATION/TRAINING PROGRAM

## 2024-12-09 PROCEDURE — 90656 IIV3 VACC NO PRSV 0.5 ML IM: CPT | Performed by: STUDENT IN AN ORGANIZED HEALTH CARE EDUCATION/TRAINING PROGRAM

## 2024-12-09 NOTE — LETTER
[facility]  Vaccine Consent Form    Patient Name:  Oscar Mendoza  Patient :  1965     Vaccine(s) Ordered    Hepatitis B Vaccine Adult IM (ENGERIX/RECOMBIVAX)  Fluzone >6mos ([id number])        Screening Checklist  The following questions should be completed prior to vaccination. If you answer “yes” to any question, it does not necessarily mean you should not be vaccinated. It just means we may need to clarify or ask more questions. If a question is unclear, please ask your healthcare provider to explain it.    Yes No   Any fever or moderate to severe illness today (mild illness and/or antibiotic treatment are not contraindications)?     Do you have a history of a serious reaction to any previous vaccinations, such as anaphylaxis, encephalopathy within 7 days, Guillain-McGrath syndrome within 6 weeks, seizure?     Have you received any live vaccine(s) (e.g MMR, VANI) or any other vaccines in the last month (to ensure duplicate doses aren't given)?     Do you have an anaphylactic allergy to latex (DTaP, DTaP-IPV, Hep A, Hep B, MenB, RV, Td, Tdap), baker’s yeast (Hep B, HPV), polysorbates (RSV, nirsevimab, PCV 20, Rotavirrus, Tdap, Shingrix), or gelatin (VANI, MMR)?     Do you have an anaphylactic allergy to neomycin (Rabies, VANI, MMR, IPV, Hep A), polymyxin B (IPV), or streptomycin (IPV)?      Any cancer, leukemia, AIDS, or other immune system disorder? (VANI, MMR, RV)     Do you have a parent, brother, or sister with an immune system problem (if immune competence of vaccine recipient clinically verified, can proceed)? (MMR, VANI)     Any recent steroid treatments for >2 weeks, chemotherapy, or radiation treatment? (VANI, MMR)     Have you received antibody-containing blood transfusions or IVIG in the past 11 months (recommended interval is dependent on product)? (MMR, VANI)     Have you taken antiviral drugs (acyclovir, famciclovir, valacyclovir for VANI) in the last 24 or 48 hours, respectively?      Are you  "pregnant or planning to become pregnant within 1 month? (VANI, MMR, HPV, IPV, MenB, Abrexvy; For Hep B- refer to Engerix-B; For RSV - Abrysvo is indicated for 32-36 weeks of pregnancy from September to January)     For infants, have you ever been told your child has had intussusception or a medical emergency involving obstruction of the intestine (Rotavirus)? If not for an infant, can skip this question.         *Ordering Physicians/APC should be consulted if \"yes\" is checked by the patient or guardian above.  I have received, read, and understand the Vaccine Information Statement (VIS) for each vaccine ordered.  I have considered my or my child's health status as well as the health status of my close contacts.  I have taken the opportunity to discuss my vaccine questions with my or my child's health care provider.   I have requested that the ordered vaccine(s) be given to me or my child.  I understand the benefits and risks of the vaccines.  I understand that I should remain in the clinic for 15 minutes after receiving the vaccine(s).  _________________________________________________________  Signature of Patient or Parent/Legal Guardian ____________________  Date     "

## 2024-12-09 NOTE — PROGRESS NOTES
Physical Exam     Patient Name: Oscar Mendoza  : 1965   MRN: [id number]   Care Team: Patient Care Team:  Ivon Wilder DO as PCP - General (Internal Medicine)    Chief Complaint:    Chief Complaint   Patient presents with    Annual Exam       History of Present Illness: Oscar Mendoza is a 58 y.o. male with alcoholic cirrhosis (sober since ), GERD, HLD, s/p splenectomy who is presents today for annual healthcare maintenance.     Depression: PHQ-2 Depression Screening  PHQ-2 Depression Screening  Little interest or pleasure in doing things? Not at all   Feeling down, depressed, or hopeless? Not at all   PHQ-2 Total Score 0      He reports feeling generalized malaise, very mild sore throat, congestion, cough for the past 3 days. Improving without intervention.     He was scheduled with urology for elevated PSA but missed appointment due to having covid and needs to reschedule    Doesn't have insurance right now but plans to have colonoscopy and HCC screening once insurance kicks in    Reports eating low fat diet but high in carbs - describes sweet tooth eating cookies/candies/sugary drinks often and would like to cut back on this    No alcohol     Health Maintenance   Topic Date Due    COLORECTAL CANCER SCREENING  Never done    ZOSTER VACCINE (1 of 2) Never done    COVID-19 Vaccine (2 - - season) 2024    Hepatitis B (3 of 3 - 19+ 3-dose series) 2025    TDAP/TD VACCINES (3 - Td or Tdap) 2025    LIPID PANEL  2025    ANNUAL PHYSICAL  2025    BMI FOLLOWUP  2025    Pneumococcal Vaccine 0-64 (3 of 3 - PPSV23 or PCV20) 2032    HEPATITIS C SCREENING  Completed    INFLUENZA VACCINE  Completed       Subjective      Review of Systems:   Review of Systems - See HPI    Past Medical History:   Past Medical History:   Diagnosis Date    Alcohol use     Daily since     Allergic rhinitis     Closed fracture of phalanx of left index finger     Elevated LFTs      Fracture lumbar vertebra-closed 02/04/2016    L1 L2 L3 - left tansverse processes    GERD (gastroesophageal reflux disease)     Hyperlipidemia 2016    severe    IVCD (intraventricular conduction defect)     Left inguinal hernia     Liver laceration 2016    Respiratory failure following trauma and surgery 02/04/2016    vent for 4 days    SAH (subarachnoid hemorrhage) 02/04/2016    Ventral hernia 2016    Vitamin B deficiency     Vitamin D deficiency        Past Surgical History:   Past Surgical History:   Procedure Laterality Date    EXPLORATORY LAPAROTOMY  02/04/2016    [facility] -- splenectomy, short small bowel resect    FRACTURE SURGERY      HIP FRACTURE SURGERY Left 02/06/2016    OR/IF     LIVER SURGERY  02/04/2016    repair laceration and bleeding    SPLENECTOMY         Family History:   Family History   Problem Relation Age of Onset    Hypertension Mother     Kidney disease Mother     Osteoarthritis Father         TKA    No Known Problems Sister     Lung cancer Maternal Grandfather     Lung cancer Maternal Grandmother        Social History:   Social History     Socioeconomic History    Marital status:    Tobacco Use    Smoking status: Never    Smokeless tobacco: Never   Vaping Use    Vaping status: Never Used   Substance and Sexual Activity    Alcohol use: Not Currently     Alcohol/week: 42.0 standard drinks of alcohol     Types: 21 Cans of beer, 21 Shots of liquor per week    Drug use: No    Sexual activity: Yes     Partners: Female       Tobacco History:   Social History     Tobacco Use   Smoking Status Never   Smokeless Tobacco Never       Medications:     Current Outpatient Medications:     Ergocalciferol (Vitamin D2) 50 MCG (2000 UT) tablet, Take  by mouth., Disp: , Rfl:     Multiple Vitamins-Minerals (CENTRUM ADULTS PO), Take 1 tablet by mouth Daily., Disp: , Rfl:     vitamin B-12 (CYANOCOBALAMIN) 2500 MCG sublingual tablet tablet, Place 1 tablet under the tongue Daily., Disp: , Rfl:     Allergies:   No  "Known Allergies    Past Medical History, Social History, Family History and Care Team were all reviewed with patient and updated as appropriate.       Objective     Physical Exam:  Vital Signs:   Vitals:    12/09/24 1228   BP: 132/80   Pulse: 87   SpO2: 99%   Weight: 80.5 kg (177 lb 6.4 oz)   Height: 177.8 cm (70\")     Body mass index is 25.45 kg/m².     Physical Exam  Vitals reviewed.   Constitutional:       Appearance: Normal appearance.   Cardiovascular:      Rate and Rhythm: Normal rate.   Pulmonary:      Effort: Pulmonary effort is normal. No respiratory distress.   Abdominal:      Comments: +nontender abdominal hernia is palpable   Skin:     General: Skin is warm and dry.   Neurological:      Mental Status: He is alert.   Psychiatric:         Mood and Affect: Mood normal.         Behavior: Behavior normal.         Judgment: Judgment normal.         Assessment / Plan      Assessment/Plan:   Problems Addressed This Visit  Diagnoses and all orders for this visit:    1. Annual physical exam (Primary)  -     Lipid Panel w/ Chol/HDL Ratio; Future    2. Pure hypercholesterolemia  -     Lipid Panel w/ Chol/HDL Ratio; Future    3. Alcoholic cirrhosis of liver without ascites  -     US Liver; Future    4. Vitamin D deficiency    5. Vitamin B deficiency    6. Gastroesophageal reflux disease, unspecified whether esophagitis present    7. Screening for colon cancer  -     Ambulatory Referral For Screening Colonoscopy    8. Immunization due  -     Hepatitis B Vaccine Adult IM (ENGERIX/RECOMBIVAX)  -     Fluzone >6mos ([id number])    9. Screening for diabetes mellitus    10. Elevated PSA  -     PSA DIAGNOSTIC ONLY; Future        Healthcare Maintenance   Vaccines:  Influenza today  HAV 1/2 completed - encouraged to complete at pharmacy  HBV 2/3 today  Encouraged shingrix and covid at pharmacy   Meningococcus serotype B 2023 (revaccinate every 3 years with hx of splenectomy)     Cancer Screening  -Colon cancer screening due - " ordered   -HCC surveillance - ordered   -PSA repeat today     Other  -PHQ score 0  -Counseled on importance of maintaining healthy diet and routine exercise. Encouraged 150 min exercise per week.  -Tobacco cessation: not indicated, nonsmoker  -Blood pressure is at goal <130/80  -Metabolic screening UTD    Encouraged routine eye and dental exams.     Alcoholic cirrhosis -   Asymptomatic, well compensated without ascites, HE, known EV  Last seen by GI in 2022   Sober since 1/1/2022 - counseled on importance of continued abstinence   EGD ordered 2022 but I do not see record of this completed   -Second Hepatitis A due. Second Hepatitis B due.  -RUQ U/S for HCC surveillance ordered    Prediabetes - A1c 6.0% - discussed importance of dietary modification     B12 def - recently restarted his B12 supplement      Elevated PSA - encouraged him to reschedule urology appointment and will repeat PSA    Following with [facility] for abdominal hernia - planning for surgical repair     HLD - last lipid panel was not fasting, will repeat this today and determine statin needs   Previous lipid panel -   Lab Results   Component Value Date    CHOL 216 (H) 09/18/2024    CHOL 289 (H) 02/06/2017     Lab Results   Component Value Date    TRIG 197 (H) 09/18/2024    TRIG 143 01/26/2022    TRIG 262 (H) 02/06/2017     Lab Results   Component Value Date    HDL 35 (L) 09/18/2024    HDL 24 (L) 01/26/2022    HDL 58 02/06/2017     Lab Results   Component Value Date     (H) 09/18/2024     (H) 01/26/2022     (H) 02/06/2017     Lab Results   Component Value Date    VLDL 36 09/18/2024    VLDL 26 01/26/2022    VLDL 40 11/10/2016     Lab Results   Component Value Date    LDLHDL 4.05 09/18/2024      The 10-year ASCVD risk score (Murtaza CARREON, et al., 2019) is: 9.4%    Values used to calculate the score:      Age: 58 years      Sex: Male      Is Non- : No      Diabetic: No      Tobacco smoker: No      Systolic Blood  Pressure: 132 mmHg      Is BP treated: No      HDL Cholesterol: 35 mg/dL      Total Cholesterol: 216 mg/dL      Plan of care reviewed with patient at the conclusion of today's visit. Education was provided regarding diagnosis and management.  Patient verbalizes understanding of and agreement with management plan.    Follow Up:   Return in about 1 year (around 12/9/2025) for Annual physical.        DO BLANCA Bentley RD  [facility]  [address]  [facility] [address]  Fax [phone]  Phone [phone]

## 2025-08-05 ENCOUNTER — TELEMEDICINE (OUTPATIENT)
Dept: FAMILY MEDICINE CLINIC | Facility: CLINIC | Age: 60
End: 2025-08-05
Payer: COMMERCIAL

## 2025-08-05 ENCOUNTER — PRIOR AUTHORIZATION (OUTPATIENT)
Dept: FAMILY MEDICINE CLINIC | Facility: CLINIC | Age: 60
End: 2025-08-05

## 2025-08-05 VITALS — BODY MASS INDEX: 25.45 KG/M2 | HEIGHT: 70 IN

## 2025-08-05 DIAGNOSIS — N52.9 ERECTILE DYSFUNCTION, UNSPECIFIED ERECTILE DYSFUNCTION TYPE: Primary | ICD-10-CM

## 2025-08-05 DIAGNOSIS — Z12.11 SCREENING FOR COLON CANCER: ICD-10-CM

## 2025-08-05 DIAGNOSIS — R97.20 ELEVATED PSA: ICD-10-CM

## 2025-08-05 PROCEDURE — 99214 OFFICE O/P EST MOD 30 MIN: CPT | Performed by: STUDENT IN AN ORGANIZED HEALTH CARE EDUCATION/TRAINING PROGRAM

## 2025-08-05 RX ORDER — SILDENAFIL 100 MG/1
100 TABLET, FILM COATED ORAL DAILY PRN
Qty: 30 TABLET | Refills: 0 | Status: SHIPPED | OUTPATIENT
Start: 2025-08-05